# Patient Record
Sex: FEMALE | Race: WHITE | NOT HISPANIC OR LATINO | Employment: UNEMPLOYED | URBAN - NONMETROPOLITAN AREA
[De-identification: names, ages, dates, MRNs, and addresses within clinical notes are randomized per-mention and may not be internally consistent; named-entity substitution may affect disease eponyms.]

---

## 2024-01-19 PROBLEM — E11.9 DIABETES MELLITUS (MULTI): Status: ACTIVE | Noted: 2024-01-19

## 2024-01-19 PROBLEM — M10.9 GOUT: Status: ACTIVE | Noted: 2024-01-19

## 2024-01-19 PROBLEM — R60.0 EDEMA OF BOTH LEGS: Status: ACTIVE | Noted: 2024-01-19

## 2024-01-19 PROBLEM — I10 ESSENTIAL HYPERTENSION: Status: ACTIVE | Noted: 2024-01-19

## 2024-01-19 PROBLEM — E66.9 OBESE: Status: ACTIVE | Noted: 2024-01-19

## 2024-01-19 PROBLEM — R42 DIZZINESS: Status: ACTIVE | Noted: 2024-01-19

## 2024-01-19 RX ORDER — ATENOLOL AND CHLORTHALIDONE TABLET 50; 25 MG/1; MG/1
1 TABLET ORAL DAILY
COMMUNITY
Start: 2022-09-21

## 2024-01-19 RX ORDER — METFORMIN HYDROCHLORIDE 500 MG/1
500 TABLET, EXTENDED RELEASE ORAL
COMMUNITY
Start: 2023-03-18

## 2024-01-19 RX ORDER — OXYBUTYNIN CHLORIDE 10 MG/1
5 TABLET, EXTENDED RELEASE ORAL DAILY
COMMUNITY
Start: 2023-02-01

## 2024-01-19 RX ORDER — POTASSIUM CHLORIDE 1500 MG/1
20 TABLET, EXTENDED RELEASE ORAL 4 TIMES DAILY
COMMUNITY
Start: 2022-09-21 | End: 2024-03-12 | Stop reason: SDUPTHER

## 2024-01-19 RX ORDER — OMEPRAZOLE 20 MG/1
20 CAPSULE, DELAYED RELEASE ORAL
COMMUNITY
Start: 2023-03-18

## 2024-01-19 RX ORDER — AMLODIPINE BESYLATE 5 MG/1
5 TABLET ORAL DAILY
COMMUNITY
Start: 2023-03-13 | End: 2024-03-07 | Stop reason: SDUPTHER

## 2024-01-19 RX ORDER — ALLOPURINOL 300 MG/1
300 TABLET ORAL DAILY
COMMUNITY
Start: 2023-03-18

## 2024-01-19 RX ORDER — LISINOPRIL 10 MG/1
10 TABLET ORAL DAILY
COMMUNITY
Start: 2023-03-13 | End: 2024-03-07 | Stop reason: SDUPTHER

## 2024-03-05 ENCOUNTER — APPOINTMENT (OUTPATIENT)
Dept: CARDIOLOGY | Facility: CLINIC | Age: 79
End: 2024-03-05
Payer: MEDICARE

## 2024-03-07 DIAGNOSIS — I10 ESSENTIAL HYPERTENSION: ICD-10-CM

## 2024-03-07 RX ORDER — AMLODIPINE BESYLATE 5 MG/1
5 TABLET ORAL DAILY
Qty: 90 TABLET | Refills: 3 | Status: SHIPPED | OUTPATIENT
Start: 2024-03-07 | End: 2024-03-12 | Stop reason: SDUPTHER

## 2024-03-07 RX ORDER — LISINOPRIL 10 MG/1
10 TABLET ORAL DAILY
Qty: 90 TABLET | Refills: 3 | Status: SHIPPED | OUTPATIENT
Start: 2024-03-07 | End: 2024-03-12 | Stop reason: SDUPTHER

## 2024-03-09 DIAGNOSIS — I10 ESSENTIAL HYPERTENSION: ICD-10-CM

## 2024-03-11 DIAGNOSIS — I10 ESSENTIAL HYPERTENSION: ICD-10-CM

## 2024-03-12 RX ORDER — AMLODIPINE BESYLATE 5 MG/1
5 TABLET ORAL DAILY
Qty: 90 TABLET | Refills: 3 | Status: SHIPPED | OUTPATIENT
Start: 2024-03-12

## 2024-03-12 RX ORDER — POTASSIUM CHLORIDE 1500 MG/1
20 TABLET, EXTENDED RELEASE ORAL 4 TIMES DAILY
Qty: 360 TABLET | Refills: 3 | Status: SHIPPED | OUTPATIENT
Start: 2024-03-12

## 2024-03-12 RX ORDER — LISINOPRIL 10 MG/1
10 TABLET ORAL DAILY
Qty: 90 TABLET | Refills: 3 | Status: SHIPPED | OUTPATIENT
Start: 2024-03-12

## 2024-04-11 ENCOUNTER — OFFICE VISIT (OUTPATIENT)
Dept: CARDIOLOGY | Facility: CLINIC | Age: 79
End: 2024-04-11
Payer: MEDICARE

## 2024-04-11 VITALS
WEIGHT: 219 LBS | HEART RATE: 68 BPM | SYSTOLIC BLOOD PRESSURE: 136 MMHG | DIASTOLIC BLOOD PRESSURE: 80 MMHG | HEIGHT: 64 IN | BODY MASS INDEX: 37.39 KG/M2

## 2024-04-11 DIAGNOSIS — E66.9 CLASS 2 OBESITY: ICD-10-CM

## 2024-04-11 DIAGNOSIS — M1A.9XX0 CHRONIC GOUT WITHOUT TOPHUS, UNSPECIFIED CAUSE, UNSPECIFIED SITE: ICD-10-CM

## 2024-04-11 DIAGNOSIS — I10 ESSENTIAL HYPERTENSION: Primary | ICD-10-CM

## 2024-04-11 DIAGNOSIS — Z78.9 NEVER SMOKED TOBACCO: ICD-10-CM

## 2024-04-11 DIAGNOSIS — E11.9 DIABETES MELLITUS TYPE II, NON INSULIN DEPENDENT (MULTI): ICD-10-CM

## 2024-04-11 PROCEDURE — 1159F MED LIST DOCD IN RCRD: CPT | Performed by: INTERNAL MEDICINE

## 2024-04-11 PROCEDURE — 3075F SYST BP GE 130 - 139MM HG: CPT | Performed by: INTERNAL MEDICINE

## 2024-04-11 PROCEDURE — 1036F TOBACCO NON-USER: CPT | Performed by: INTERNAL MEDICINE

## 2024-04-11 PROCEDURE — 3079F DIAST BP 80-89 MM HG: CPT | Performed by: INTERNAL MEDICINE

## 2024-04-11 PROCEDURE — 99214 OFFICE O/P EST MOD 30 MIN: CPT | Performed by: INTERNAL MEDICINE

## 2024-04-11 NOTE — PROGRESS NOTES
"Subjective   Nalini Snow is a 78 y.o. female       Chief Complaint    Annual Exam          HPI   Patient is in the office for follow-up for the problems noted below.  She has had no cardiac events since her last visit.  She had a fall last summer when she was at [place] tripping  with no major injuries.  Lab data from December 2023 were reviewed and her numbers look well including A1c of 6.5.  Review of system essentially normal physical examination is only remarkable for class II obesity.  Her pressure is in the upper normal range.  Low-salt diet, weight control and exercise were encouraged.    ASSESSMENT AND PLAN:      1. Hypertension, controlled on amlodipine, atenolol and chlorthalidone along with lisinopril. Renal function is being followed and has been normal  2.  Class II obesity. Encouraged the patient cut back calorie intake to bring her weight under control.  3. Diabetes, on metformin, managed by PCP. Currently under control  4. Gout, on allopurinol for prevention. No recent flareup  5.  Coronary calcium score in March 2023 was 1 which is great  will come back to see me on annual basis.        Bro Hannon MD, [facility]   Review of Systems   All other systems reviewed and are negative.           Vitals:    04/11/24 0914 04/11/24 0939   BP: 152/80 136/80   BP Location: Left arm    Patient Position: Sitting    Pulse: 68    Weight: 99.3 kg (219 lb)    Height: 1.626 m (5' 4\")         Objective   Physical Exam    Allergies  Fish containing products     Current Medications    Current Outpatient Medications:     allopurinol (Zyloprim) 300 mg tablet, Take 1 tablet (300 mg) by mouth once daily., Disp: , Rfl:     amLODIPine (Norvasc) 5 mg tablet, TAKE ONE TABLET BY MOUTH DAILY, Disp: 90 tablet, Rfl: 3    atenoloL-chlorthalidone (Tenoretic) 50-25 mg tablet, Take 1 tablet by mouth once daily., Disp: , Rfl:     Klor-Con M20 20 mEq ER tablet, TAKE ONE TABLET BY MOUTH FOUR TIMES A DAY, Disp: 360 tablet, Rfl: " 3    lisinopril 10 mg tablet, TAKE ONE TABLET BY MOUTH DAILY, Disp: 90 tablet, Rfl: 3    metFORMIN  mg 24 hr tablet, Take 1 tablet (500 mg) by mouth once daily in the evening. Take with meals., Disp: , Rfl:     omeprazole (PriLOSEC) 20 mg DR capsule, Take 1 capsule (20 mg) by mouth once daily in the morning. Take before meals., Disp: , Rfl:     oxybutynin XL (Ditropan-XL) 10 mg 24 hr tablet, Take 5 mg by mouth once daily., Disp: , Rfl:                      Assessment/Plan   1. Essential hypertension  Follow Up In Cardiology      2. BMI 37.0-37.9, adult        3. Never smoked tobacco        4. Diabetes mellitus type II, non insulin dependent (CMS/HCC)        5. Class 2 obesity        6. Chronic gout without tophus, unspecified cause, unspecified site                 Scribe Attestation  By signing my name below, Sada BLUNT LPN, [employer]rib[employer]stantin   attest that this documentation has been prepared under the direction and in the presence of Bro Hannon MD.     Provider Attestation - Scribe documentation    All medical record entries made by the Scribe were at my direction and personally dictated by me. I have reviewed the chart and agree that the record accurately reflects my personal performance of the history, physical exam, discussion and plan.

## 2024-04-11 NOTE — PATIENT INSTRUCTIONS
Please bring all medicines, vitamins, and herbal supplements with you when you come to the office.    Prescriptions will not be filled unless you are compliant with your follow up appointments or have a follow up appointment scheduled as per instruction of your physician. Refills should be requested at the time of your visit.     Fall Prevention Education Given     Banana daily for hypokalemia   Same medications  1 year    BMI was above normal measurement. Current weight: 99.3 kg (219 lb)  Weight change since last visit (-) denotes wt loss 2 lbs   Weight loss needed to achieve BMI 25: 73.7 Lbs  Weight loss needed to achieve BMI 30: 44.6 Lbs  Provided instructions on dietary changes  Provided instructions on exercise.

## 2024-04-11 NOTE — LETTER
April 11, 2024     Fernando Reeder MD  [address] [facility] [address]    Patient: Nalini Snow   YOB: 1945   Date of Visit: 4/11/2024       Dear Dr. Fernando Reeder MD:    Thank you for referring Nalini Snow to me for evaluation. Below are my notes for this consultation.  If you have questions, please do not hesitate to call me. I look forward to following your patient along with you.       Sincerely,     Bro Hannon MD      CC: No Recipients  ______________________________________________________________________________________    Subjective   Nalini Snow is a 78 y.o. female       Chief Complaint    Annual Exam          HPI   Patient is in the office for follow-up for the problems noted below.  She has had no cardiac events since her last visit.  She had a fall last summer when she was at [place] tripping  with no major injuries.  Lab data from December 2023 were reviewed and her numbers look well including A1c of 6.5.  Review of system essentially normal physical examination is only remarkable for class II obesity.  Her pressure is in the upper normal range.  Low-salt diet, weight control and exercise were encouraged.    ASSESSMENT AND PLAN:      1. Hypertension, controlled on amlodipine, atenolol and chlorthalidone along with lisinopril. Renal function is being followed and has been normal  2.  Class II obesity. Encouraged the patient cut back calorie intake to bring her weight under control.  3. Diabetes, on metformin, managed by PCP. Currently under control  4. Gout, on allopurinol for prevention. No recent flareup  5.  Coronary calcium score in March 2023 was 1 which is great  will come back to see me on annual basis.        Bro Hannon MD, [facility]   Review of Systems   All other systems reviewed and are negative.           Vitals:    04/11/24 0914 04/11/24 0939   BP: 152/80 136/80   BP Location: Left arm    Patient  "Position: Sitting    Pulse: 68    Weight: 99.3 kg (219 lb)    Height: 1.626 m (5' 4\")         Objective   Physical Exam    Allergies  Fish containing products     Current Medications    Current Outpatient Medications:   •  allopurinol (Zyloprim) 300 mg tablet, Take 1 tablet (300 mg) by mouth once daily., Disp: , Rfl:   •  amLODIPine (Norvasc) 5 mg tablet, TAKE ONE TABLET BY MOUTH DAILY, Disp: 90 tablet, Rfl: 3  •  atenoloL-chlorthalidone (Tenoretic) 50-25 mg tablet, Take 1 tablet by mouth once daily., Disp: , Rfl:   •  Klor-Con M20 20 mEq ER tablet, TAKE ONE TABLET BY MOUTH FOUR TIMES A DAY, Disp: 360 tablet, Rfl: 3  •  lisinopril 10 mg tablet, TAKE ONE TABLET BY MOUTH DAILY, Disp: 90 tablet, Rfl: 3  •  metFORMIN  mg 24 hr tablet, Take 1 tablet (500 mg) by mouth once daily in the evening. Take with meals., Disp: , Rfl:   •  omeprazole (PriLOSEC) 20 mg DR capsule, Take 1 capsule (20 mg) by mouth once daily in the morning. Take before meals., Disp: , Rfl:   •  oxybutynin XL (Ditropan-XL) 10 mg 24 hr tablet, Take 5 mg by mouth once daily., Disp: , Rfl:                      Assessment/Plan   1. Essential hypertension  Follow Up In Cardiology      2. BMI 37.0-37.9, adult        3. Never smoked tobacco        4. Diabetes mellitus type II, non insulin dependent (CMS/[facility])        5. Class 2 obesity        6. Chronic gout without tophus, unspecified cause, unspecified site                 Scribe Attestation  By signing my name below, Sada BLUNT LPN, Scribe   attest that this documentation has been prepared under the direction and in the presence of Bro Hannon MD.     Provider Attestation - Scribe documentation    All medical record entries made by the Scribe were at my direction and personally dictated by me. I have reviewed the chart and agree that the record accurately reflects my personal performance of the history, physical exam, discussion and plan.   "

## 2024-08-15 DIAGNOSIS — I10 ESSENTIAL HYPERTENSION: ICD-10-CM

## 2024-08-16 RX ORDER — ATENOLOL AND CHLORTHALIDONE TABLET 50; 25 MG/1; MG/1
1 TABLET ORAL DAILY
Qty: 90 TABLET | Refills: 3 | Status: SHIPPED | OUTPATIENT
Start: 2024-08-16 | End: 2025-08-16

## 2025-03-03 DIAGNOSIS — I10 ESSENTIAL HYPERTENSION: ICD-10-CM

## 2025-03-03 RX ORDER — LISINOPRIL 10 MG/1
10 TABLET ORAL DAILY
Qty: 90 TABLET | Refills: 3 | Status: SHIPPED | OUTPATIENT
Start: 2025-03-03

## 2025-03-03 RX ORDER — AMLODIPINE BESYLATE 5 MG/1
5 TABLET ORAL DAILY
Qty: 90 TABLET | Refills: 3 | Status: SHIPPED | OUTPATIENT
Start: 2025-03-03

## 2025-04-10 ENCOUNTER — APPOINTMENT (OUTPATIENT)
Dept: CARDIOLOGY | Facility: CLINIC | Age: 80
End: 2025-04-10
Payer: MEDICARE

## 2025-04-10 VITALS
HEART RATE: 72 BPM | DIASTOLIC BLOOD PRESSURE: 58 MMHG | SYSTOLIC BLOOD PRESSURE: 110 MMHG | HEIGHT: 64 IN | BODY MASS INDEX: 35.51 KG/M2 | WEIGHT: 208 LBS

## 2025-04-10 DIAGNOSIS — I10 ESSENTIAL HYPERTENSION: Primary | ICD-10-CM

## 2025-04-10 DIAGNOSIS — E66.812 CLASS 2 OBESITY: ICD-10-CM

## 2025-04-10 DIAGNOSIS — E11.9 TYPE 2 DIABETES MELLITUS WITHOUT COMPLICATION, UNSPECIFIED WHETHER LONG TERM INSULIN USE: ICD-10-CM

## 2025-04-10 DIAGNOSIS — M10.9 GOUT, UNSPECIFIED CAUSE, UNSPECIFIED CHRONICITY, UNSPECIFIED SITE: ICD-10-CM

## 2025-04-10 DIAGNOSIS — Z78.9 NEVER SMOKED TOBACCO: ICD-10-CM

## 2025-04-10 PROCEDURE — 99214 OFFICE O/P EST MOD 30 MIN: CPT | Performed by: INTERNAL MEDICINE

## 2025-04-10 PROCEDURE — 3074F SYST BP LT 130 MM HG: CPT | Performed by: INTERNAL MEDICINE

## 2025-04-10 PROCEDURE — 1159F MED LIST DOCD IN RCRD: CPT | Performed by: INTERNAL MEDICINE

## 2025-04-10 PROCEDURE — 1036F TOBACCO NON-USER: CPT | Performed by: INTERNAL MEDICINE

## 2025-04-10 PROCEDURE — 3078F DIAST BP <80 MM HG: CPT | Performed by: INTERNAL MEDICINE

## 2025-04-10 RX ORDER — ACETAMINOPHEN 500 MG
50 TABLET ORAL DAILY
COMMUNITY

## 2025-04-10 RX ORDER — SPIRONOLACTONE 25 MG/1
25 TABLET ORAL DAILY
COMMUNITY
Start: 2025-03-10

## 2025-04-10 RX ORDER — ASPIRIN 81 MG/1
81 TABLET ORAL 2 TIMES WEEKLY
COMMUNITY

## 2025-04-10 ASSESSMENT — ENCOUNTER SYMPTOMS
DYSPNEA ON EXERTION: 1
LIGHT-HEADEDNESS: 1

## 2025-04-10 NOTE — PATIENT INSTRUCTIONS
Please bring all medicines, vitamins, and herbal supplements with you when you come to the office.    Prescriptions will not be filled unless you are compliant with your follow up appointments or have a follow up appointment scheduled as per instruction of your physician. Refills should be requested at the time of your visit.     BMI was above normal measurement. Current weight: 94.3 kg (208 lb)  Weight change since last visit (-) denotes wt loss -11 lbs   Weight loss needed to achieve BMI 25: 62.7 Lbs  Weight loss needed to achieve BMI 30: 33.6 Lbs  Provided instructions on dietary changes.    One year

## 2025-04-10 NOTE — PROGRESS NOTES
"Chief Complaint   Patient presents with    Follow-up     1 year Follow up for Hypertension         Subjective   Nalini Snow is a 79 y.o. female     HPI   Patient is in the office for follow-up for essential hypertension, she is also with type 2 diabetes managed by Dr. Reeder her PCP.  She has had no events since her last visit a year ago.  She is compliant medical therapy and brought with her sheets of blood pressure reading from home the majority of which were very satisfactory.  She seemed to have no side effect of current medications.  Lab work that was done last week was reviewed and shared with her and it was unremarkable, A1c around 6.7.  She reports no palpitations orthopnea PND or lower extremity edema.  She maintains active lifestyle.  She does not have documentation of sleep apnea.  Her weight has dropped down several pounds from last visit which was encouraging and this was expressed to the patient    ASSESSMENT AND PLAN:      1.  Essential hypertension, controlled on amlodipine, atenolol, lisinopril and chlorthalidone . Renal function is being followed and has been normal  2.  Class II obesity.  Her weight has come down nicely since last visit and encouragement for more weight loss was provided  3.  Type II diabetes, on metformin, managed by PCP. Currently under control A1c 6.7 April 2025  4. Gout, on allopurinol for prevention. No recent flareup  5.  Coronary calcium score in March 2023 was 1 which is great  will come back to see me on annual basis.  Review of Systems   Constitutional: Positive for malaise/fatigue.   Cardiovascular:  Positive for dyspnea on exertion.   Neurological:  Positive for light-headedness.   All other systems reviewed and are negative.           Vitals:    04/10/25 0901   BP: 110/58   BP Location: Left arm   Patient Position: Sitting   Pulse: 72   Weight: 94.3 kg (208 lb)   Height: 1.626 m (5' 4\")        Objective   Physical Exam  Constitutional:       Appearance: Normal " appearance.   HENT:      Nose: Nose normal.   Neck:      Vascular: No carotid bruit.   Cardiovascular:      Rate and Rhythm: Normal rate.      Pulses: Normal pulses.      Heart sounds: Normal heart sounds.   Pulmonary:      Effort: Pulmonary effort is normal.   Abdominal:      General: Bowel sounds are normal.      Palpations: Abdomen is soft.   Musculoskeletal:         General: Normal range of motion.      Cervical back: Normal range of motion.      Right lower leg: No edema.      Left lower leg: No edema.   Skin:     General: Skin is warm and dry.   Neurological:      General: No focal deficit present.      Mental Status: She is alert.   Psychiatric:         Mood and Affect: Mood normal.         Behavior: Behavior normal.         Thought Content: Thought content normal.         Judgment: Judgment normal.         Allergies  Fish containing products     Current Medications    Current Outpatient Medications:     allopurinol (Zyloprim) 300 mg tablet, Take 1 tablet (300 mg) by mouth once daily., Disp: , Rfl:     amLODIPine (Norvasc) 5 mg tablet, TAKE 1 TABLET BY MOUTH DAILY, Disp: 90 tablet, Rfl: 3    aspirin 81 mg EC tablet, Take 1 tablet (81 mg) by mouth 2 times a week., Disp: , Rfl:     atenoloL-chlorthalidone (Tenoretic) 50-25 mg tablet, Take 1 tablet by mouth once daily., Disp: 90 tablet, Rfl: 3    cholecalciferol (Vitamin D3) 50 mcg (2,000 units) capsule, Take 1 capsule (50 mcg) by mouth once daily., Disp: , Rfl:     Klor-Con M20 20 mEq ER tablet, TAKE ONE TABLET BY MOUTH FOUR TIMES A DAY, Disp: 360 tablet, Rfl: 3    lisinopril 10 mg tablet, TAKE 1 TABLET BY MOUTH DAILY, Disp: 90 tablet, Rfl: 3    metFORMIN  mg 24 hr tablet, Take 1 tablet (500 mg) by mouth once daily in the evening. Take with meals., Disp: , Rfl:     omeprazole (PriLOSEC) 20 mg DR capsule, Take 1 capsule (20 mg) by mouth once daily in the morning. Take before meals., Disp: , Rfl:     oxybutynin XL (Ditropan-XL) 10 mg 24 hr tablet, Take 5 mg  by mouth once daily., Disp: , Rfl:     spironolactone (Aldactone) 25 mg tablet, 1 tablet (25 mg) once daily., Disp: , Rfl:     vit C/E/Zn/coppr/lutein/zeaxan (EYE HEALTH VITAMIN-MINERAL ORAL), Take 1 capsule by mouth early in the morning.., Disp: , Rfl:                      Assessment/Plan   1. Essential hypertension  Follow Up In Cardiology      2. Type 2 diabetes mellitus without complication, unspecified whether long term insulin use        3. Gout, unspecified cause, unspecified chronicity, unspecified site        4. Never smoked tobacco        5. BMI 35.0-35.9,adult                 Scribe Attestation  By signing my name below, ISada LPN, Scribe   attest that this documentation has been prepared under the direction and in the presence of Bro Hannon MD.     Provider Attestation - Scribe documentation    All medical record entries made by the Scribe were at my direction and personally dictated by me. I have reviewed the chart and agree that the record accurately reflects my personal performance of the history, physical exam, discussion and plan.

## 2025-04-10 NOTE — LETTER
April 10, 2025     Fernando Reeder MD  [address] [facility] [address]    Patient: Nalini Snow   YOB: 1945   Date of Visit: 4/10/2025       Dear Dr. Fenrando Reeder MD:    Thank you for referring Nalini Snow to me for evaluation. Below are my notes for this consultation.  If you have questions, please do not hesitate to call me. I look forward to following your patient along with you.       Sincerely,     Bro Hannon MD      CC: No Recipients  ______________________________________________________________________________________    Chief Complaint   Patient presents with   • Follow-up     1 year Follow up for Hypertension         Subjective   Nalini Snow is a 79 y.o. female     HPI   Patient is in the office for follow-up for essential hypertension, she is also with type 2 diabetes managed by Dr. Reeder her PCP.  She has had no events since her last visit a year ago.  She is compliant medical therapy and brought with her sheets of blood pressure reading from home the majority of which were very satisfactory.  She seemed to have no side effect of current medications.  Lab work that was done last week was reviewed and shared with her and it was unremarkable, A1c around 6.7.  She reports no palpitations orthopnea PND or lower extremity edema.  She maintains active lifestyle.  She does not have documentation of sleep apnea.  Her weight has dropped down several pounds from last visit which was encouraging and this was expressed to the patient    ASSESSMENT AND PLAN:      1.  Essential hypertension, controlled on amlodipine, atenolol, lisinopril and chlorthalidone . Renal function is being followed and has been normal  2.  Class II obesity.  Her weight has come down nicely since last visit and encouragement for more weight loss was provided  3.  Type II diabetes, on metformin, managed by PCP. Currently under control A1c 6.7 April 2025  4.  "Gout, on allopurinol for prevention. No recent flareup  5.  Coronary calcium score in March 2023 was 1 which is great  will come back to see me on annual basis.  Review of Systems   Constitutional: Positive for malaise/fatigue.   Cardiovascular:  Positive for dyspnea on exertion.   Neurological:  Positive for light-headedness.   All other systems reviewed and are negative.           Vitals:    04/10/25 0901   BP: 110/58   BP Location: Left arm   Patient Position: Sitting   Pulse: 72   Weight: 94.3 kg (208 lb)   Height: 1.626 m (5' 4\")        Objective   Physical Exam  Constitutional:       Appearance: Normal appearance.   HENT:      Nose: Nose normal.   Neck:      Vascular: No carotid bruit.   Cardiovascular:      Rate and Rhythm: Normal rate.      Pulses: Normal pulses.      Heart sounds: Normal heart sounds.   Pulmonary:      Effort: Pulmonary effort is normal.   Abdominal:      General: Bowel sounds are normal.      Palpations: Abdomen is soft.   Musculoskeletal:         General: Normal range of motion.      Cervical back: Normal range of motion.      Right lower leg: No edema.      Left lower leg: No edema.   Skin:     General: Skin is warm and dry.   Neurological:      General: No focal deficit present.      Mental Status: She is alert.   Psychiatric:         Mood and Affect: Mood normal.         Behavior: Behavior normal.         Thought Content: Thought content normal.         Judgment: Judgment normal.         Allergies  Fish containing products     Current Medications    Current Outpatient Medications:   •  allopurinol (Zyloprim) 300 mg tablet, Take 1 tablet (300 mg) by mouth once daily., Disp: , Rfl:   •  amLODIPine (Norvasc) 5 mg tablet, TAKE 1 TABLET BY MOUTH DAILY, Disp: 90 tablet, Rfl: 3  •  aspirin 81 mg EC tablet, Take 1 tablet (81 mg) by mouth 2 times a week., Disp: , Rfl:   •  atenoloL-chlorthalidone (Tenoretic) 50-25 mg tablet, Take 1 tablet by mouth once daily., Disp: 90 tablet, Rfl: 3  •  " cholecalciferol (Vitamin D3) 50 mcg (2,000 units) capsule, Take 1 capsule (50 mcg) by mouth once daily., Disp: , Rfl:   •  Klor-Con M20 20 mEq ER tablet, TAKE ONE TABLET BY MOUTH FOUR TIMES A DAY, Disp: 360 tablet, Rfl: 3  •  lisinopril 10 mg tablet, TAKE 1 TABLET BY MOUTH DAILY, Disp: 90 tablet, Rfl: 3  •  metFORMIN  mg 24 hr tablet, Take 1 tablet (500 mg) by mouth once daily in the evening. Take with meals., Disp: , Rfl:   •  omeprazole (PriLOSEC) 20 mg DR capsule, Take 1 capsule (20 mg) by mouth once daily in the morning. Take before meals., Disp: , Rfl:   •  oxybutynin XL (Ditropan-XL) 10 mg 24 hr tablet, Take 5 mg by mouth once daily., Disp: , Rfl:   •  spironolactone (Aldactone) 25 mg tablet, 1 tablet (25 mg) once daily., Disp: , Rfl:   •  vit C/E/Zn/coppr/lutein/zeaxan (EYE HEALTH VITAMIN-MINERAL ORAL), Take 1 capsule by mouth early in the morning.., Disp: , Rfl:                      Assessment/Plan   1. Essential hypertension  Follow Up In Cardiology      2. Type 2 diabetes mellitus without complication, unspecified whether long term insulin use        3. Gout, unspecified cause, unspecified chronicity, unspecified site        4. Never smoked tobacco        5. BMI 35.0-35.9,adult                 Scribe Attestation  By signing my name below, Sada BLUNT LPN, Scribe   attest that this documentation has been prepared under the direction and in the presence of Bro Hannon MD.     Provider Attestation - Scribe documentation    All medical record entries made by the Scribe were at my direction and personally dictated by me. I have reviewed the chart and agree that the record accurately reflects my personal performance of the history, physical exam, discussion and plan.

## 2025-05-31 DIAGNOSIS — I10 ESSENTIAL HYPERTENSION: ICD-10-CM

## 2025-06-02 RX ORDER — POTASSIUM CHLORIDE 20 MEQ/1
20 TABLET, EXTENDED RELEASE ORAL 4 TIMES DAILY
Qty: 360 TABLET | Refills: 3 | Status: SHIPPED | OUTPATIENT
Start: 2025-06-02 | End: 2026-06-02

## 2025-08-30 DIAGNOSIS — I10 ESSENTIAL HYPERTENSION: ICD-10-CM

## 2025-09-03 RX ORDER — ATENOLOL AND CHLORTHALIDONE TABLET 50; 25 MG/1; MG/1
1 TABLET ORAL DAILY
Qty: 90 TABLET | Refills: 3 | Status: SHIPPED | OUTPATIENT
Start: 2025-09-03

## 2026-04-21 ENCOUNTER — APPOINTMENT (OUTPATIENT)
Dept: CARDIOLOGY | Facility: CLINIC | Age: 81
End: 2026-04-21
Payer: MEDICARE